# Patient Record
Sex: MALE | Race: AMERICAN INDIAN OR ALASKA NATIVE
[De-identification: names, ages, dates, MRNs, and addresses within clinical notes are randomized per-mention and may not be internally consistent; named-entity substitution may affect disease eponyms.]

---

## 2019-01-27 ENCOUNTER — HOSPITAL ENCOUNTER (EMERGENCY)
Dept: HOSPITAL 5 - ED | Age: 24
LOS: 1 days | Discharge: LEFT BEFORE DISCHARGE | End: 2019-01-28
Payer: SELF-PAY

## 2019-01-27 VITALS — DIASTOLIC BLOOD PRESSURE: 85 MMHG | SYSTOLIC BLOOD PRESSURE: 150 MMHG

## 2019-01-27 DIAGNOSIS — Z53.21: ICD-10-CM

## 2019-01-27 DIAGNOSIS — R30.0: Primary | ICD-10-CM

## 2019-02-01 ENCOUNTER — HOSPITAL ENCOUNTER (EMERGENCY)
Dept: HOSPITAL 5 - ED | Age: 24
Discharge: HOME | End: 2019-02-01
Payer: SELF-PAY

## 2019-02-01 DIAGNOSIS — F17.200: ICD-10-CM

## 2019-02-01 DIAGNOSIS — Z88.8: ICD-10-CM

## 2019-02-01 DIAGNOSIS — Z20.2: Primary | ICD-10-CM

## 2019-02-01 PROCEDURE — 96372 THER/PROPH/DIAG INJ SC/IM: CPT

## 2019-02-01 PROCEDURE — 99282 EMERGENCY DEPT VISIT SF MDM: CPT

## 2019-02-01 NOTE — EMERGENCY DEPARTMENT REPORT
ED Male  HPI





- General


Chief complaint: Urogenital-Male


Stated complaint: PAINFUL URINATING


Time Seen by Provider: 02/01/19 22:48


Source: patient


Mode of arrival: Ambulatory


Limitations: No Limitations





- History of Present Illness


Initial comments: 





This is a 23-year-old male reports that he was exposed to chlamydia from his 

girlfriend.  He says she tested positive and was treated and sent him to be 

treated.  He denies any penile discharge report reports that he has some 

burning.  Denies any abdominal or back pain.  Denies any fever or chills.  Den

ies any blood in his urine.


MD Complaint: other (exposure to chlamydia)


Onset/Timing: 3


Radiation: none


Severity scale (0 -10): 0


denies: discharge, swelling, mass, rash, urinary retention, blood in urine, 

dysuria, fever, nausea/vomiting, incontinence





- Related Data


Sexually active: Yes (history)


                                    Allergies











Allergy/AdvReac Type Severity Reaction Status Date / Time


 


niacin Allergy  Hives Verified 01/27/19 22:18














ED Review of Systems


ROS: 


Stated complaint: PAINFUL URINATING


Other details as noted in HPI





Constitutional: denies: chills, fever


ENT: denies: throat pain


Respiratory: denies: cough, shortness of breath


Cardiovascular: denies: chest pain, palpitations, edema, syncope


Gastrointestinal: denies: abdominal pain, nausea, vomiting


Genitourinary: other (exposure to chlamydia).  denies: urgency, dysuria, 

frequency, hematuria, discharge


Musculoskeletal: denies: back pain, arthralgia, myalgia


Skin: denies: rash


Neurological: denies: headache





ED Past Medical Hx





- Past Medical History


Previous Medical History?: No





- Surgical History


Past Surgical History?: No





- Family History


Family history: no significant





- Social History


Smoking Status: Current Every Day Smoker


Substance Use Type: None





ED Physical Exam





- General


Limitations: No Limitations


General appearance: alert, in no apparent distress





- Head


Head exam: Present: atraumatic, normocephalic, normal inspection





- Eye


Eye exam: Present: normal appearance, PERRL, EOMI


Pupils: Present: normal accommodation





- ENT


ENT exam: Present: normal exam, normal orophraynx, mucous membranes moist





- Neck


Neck exam: Present: normal inspection, full ROM.  Absent: tenderness, 

lymphadenopathy





- Respiratory


Respiratory exam: Present: normal lung sounds bilaterally.  Absent: respiratory 

distress, chest wall tenderness





- Cardiovascular


Cardiovascular Exam: Present: regular rate, normal rhythm, normal heart sounds





- GI/Abdominal


GI/Abdominal exam: Present: soft, normal bowel sounds.  Absent: distended, 

tenderness, rigid





- Extremities Exam


Extremities exam: Present: normal inspection, full ROM, normal capillary refill,

other (No cce. + 2 pulses in all extremities, no neurovascular compromise).  

Absent: tenderness, pedal edema, joint swelling





- Back Exam


Back exam: Present: normal inspection, full ROM, other (M elicited N difficu

lties O away that he no chronic profile but no).  Absent: tenderness, CVA 

tenderness (R), CVA tenderness (L)





ED Course


                                   Vital Signs











  02/01/19





  22:28


 


Temperature 98.5 F


 


Pulse Rate 98 H


 


Respiratory 18





Rate 


 


Blood Pressure 141/62


 


O2 Sat by Pulse 100





Oximetry 














- Reevaluation(s)


Reevaluation #1: 





02/01/19 23:49


Patient treated for gonorrhea and chlamydia in emergency room with Rocephin and 

azithromycin. After observation, he had no adverse reaction so he was discharged

home in stable condition.





ED Medical Decision Making





- Medical Decision Making





This is a 23-year-old male here for STD treatment after his girlfriend told him 

that she was treated for chlamydia.  He is here to be treated.  Patient was 

given azithromycin 1 g by mouth to treat chlamydia and Rocephin 250 mg IM for 

gonorrhea.  I discussed with him that he needs to practice safe sex and follow 

up with [facility] or [facility] in 7-10 days for STD 

testing.  Patient agreed and discharged home in stable condition.


Critical care attestation.: 


If time is entered above; I have spent that time in minutes in the direct care 

of this critically ill patient, excluding procedure time.








ED Disposition


Clinical Impression: 


 Exposure to STD





Disposition: DC-01 TO HOME OR SELFCARE


Is pt being admited?: No


Does the pt Need Aspirin: No


Condition: Stable


Instructions:  Chlamydia Infection (ED), Sexually Transmitted Diseases (ED), 

Safe Sex (ED)


Additional Instructions: 


Please practice safe sex


Do not have any sexual activity for the next 2 weeks and within the next 7-10 

days please go to [facility] 

to get STD check to make sure that you do not have STD.


You were treated for gonorrhea and chlamydia in emergency room.


Referrals: 


[facility] [Outside] - 7-10 days


[facility] [Outside] - 7-10 days


Forms:  Work/School Release Form(ED)

## 2019-02-03 VITALS — SYSTOLIC BLOOD PRESSURE: 141 MMHG | DIASTOLIC BLOOD PRESSURE: 62 MMHG

## 2019-12-05 ENCOUNTER — HOSPITAL ENCOUNTER (EMERGENCY)
Dept: HOSPITAL 5 - ED | Age: 24
Discharge: HOME | End: 2019-12-05
Payer: SELF-PAY

## 2019-12-05 VITALS — SYSTOLIC BLOOD PRESSURE: 166 MMHG | DIASTOLIC BLOOD PRESSURE: 76 MMHG

## 2019-12-05 DIAGNOSIS — F17.200: ICD-10-CM

## 2019-12-05 DIAGNOSIS — M94.0: Primary | ICD-10-CM

## 2019-12-05 PROCEDURE — 71046 X-RAY EXAM CHEST 2 VIEWS: CPT

## 2019-12-05 PROCEDURE — 93005 ELECTROCARDIOGRAM TRACING: CPT

## 2019-12-05 PROCEDURE — 99283 EMERGENCY DEPT VISIT LOW MDM: CPT

## 2019-12-05 PROCEDURE — 93010 ELECTROCARDIOGRAM REPORT: CPT

## 2019-12-05 NOTE — EMERGENCY DEPARTMENT REPORT
ED Chest Pain HPI





- General


Chief Complaint: Chest Pain


Stated Complaint: CHEST PAIN


Time Seen by Provider: 12/05/19 16:39


Source: patient


Mode of arrival: Ambulatory


Limitations: No Limitations





- History of Present Illness


Initial Comments: 





This is a 24-year-old -American male who presents to the emergency room 

with substernal chest pain since this morning.  Patient states he woke up with 

tightness in chest.  He is a current smoker.  No significant past medical 

history.  Patient reports one episode last month for one day which resolved.  

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 

muscle and wanted to make sure he wasn't having a heart attack.  He denies 

radiating pain, fever, chills, cough, nausea, vomiting, or weakness.


MD Complaint: chest pain


-: This morning


Onset: awoke with symptoms


Pain Location: substernal


Pain Radiation: none


Severity: moderate


Severity scale (0 -10): 6


Quality: tightness


Consistency: intermittent


Improves With: nothing


Worsens With: nothing


re: denies: nausea, vomting, diaphoresis, dyspnea, sense of impending doom


Other Symptoms: denies: cough, fever, syncope, rash, acid taste in mouth, leg 

swelling, palpitations, burping


Treatments Prior to Arrival: none


Aspirin use within the Past 7 Days: (0) No





- Related Data


                                    Allergies











Allergy/AdvReac Type Severity Reaction Status Date / Time


 


niacin Allergy  Hives Verified 01/27/19 22:18














Heart Score





- HEART Score


History: Slightly suspicious


EKG: Normal


Age: < 45


Risk factors: No known risk factors


Troponin: < normal limit


HEART Score: 0





- Critical Actions


Critical Actions: 0-3 pts:0.9-1.7%risk of adverse cardiac event.Candidate for 

discharge





ED Review of Systems


ROS: 


Stated complaint: CHEST PAIN


Other details as noted in HPI





Constitutional: denies: chills, fever


ENT: as per HPI


Respiratory: denies: cough, shortness of breath, wheezing


Cardiovascular: chest pain.  denies: palpitations


Musculoskeletal: denies: back pain, joint swelling, arthralgia


Skin: denies: rash, lesions


Neurological: denies: headache, weakness, paresthesias


Psychiatric: denies: anxiety, depression





ED Past Medical Hx





- Past Medical History


Previous Medical History?: No





- Surgical History


Past Surgical History?: No





- Social History


Smoking Status: Current Every Day Smoker





ED Physical Exam





- General


Limitations: No Limitations


General appearance: alert, in no apparent distress, obese





- ENT


ENT exam: Present: mucous membranes moist





- Respiratory


Respiratory exam: Present: normal lung sounds bilaterally, chest wall 

tenderness.  Absent: respiratory distress, wheezes, rales, rhonchi, stridor 

(tenderness along costochondral joint on the left, no erythema or swelling)





- Cardiovascular


Cardiovascular Exam: Present: regular rate, normal rhythm.  Absent: systolic 

murmur, diastolic murmur, rubs, gallop





- GI/Abdominal


GI/Abdominal exam: Present: soft, normal bowel sounds.  Absent: distended, 

tenderness, guarding, rebound, rigid





- Neurological Exam


Neurological exam: Present: alert, oriented X3, normal gait





- Psychiatric


Psychiatric exam: Present: normal affect, normal mood





- Skin


Skin exam: Present: warm, dry, intact, normal color.  Absent: rash





ED Course





                                   Vital Signs











  12/05/19





  16:37


 


Temperature 98.0 F


 


Pulse Rate 67


 


Respiratory 16





Rate 


 


Blood Pressure 166/76


 


O2 Sat by Pulse 100





Oximetry 














ED Medical Decision Making





- EKG Data


-: No EKG Interpreted by Me (EKG interpreted by attending)


EKG shows normal: sinus rhythm


Rate: bradycardia





- Radiology Data


Radiology results: report reviewed





CHEST PA AND LATERAL VIEWS





INDICATION:


Chest pain.





COMPARISON:


None.





FINDINGS:





Support devices: None.





Heart: Within normal limits.





Lungs/Pleura: No acute pulmonary or pleural findings.











IMPRESSION:


1. No significant abnormality.








- Medical Decision Making





Patient was examined by me.  Patient is nontoxic appearing and stable.  Vitals 

are normal.  Obtained EKG and x-ray of chest with no acute radiographic 

findings.  EKG interpreted by the attending sinus bradycardia.  On focal exam 

there is tenderness along osteochondral joints on the left.heart score is 0.  No

significant past medical history to believe acute MI.  Patient works in the 

warehouse and possibly pulled a muscle.  Given analgesics while in the ER.  

Instructed to take over-the-counter ibuprofen, naproxen, or Tylenol for pain 

related to costochondritis.  Patient informed of results. Instructed to take 

Tylenol or ibuprofen for pain.  Follow up with PCP or return to the ER with 

worsening symptoms.  Patient discharged home in stable condition. 


Critical care attestation.: 


If time is entered above; I have spent that time in minutes in the direct care 

of this critically ill patient, excluding procedure time.








ED Disposition


Clinical Impression: 


 Acute costochondritis





Chest pain


Qualifiers:


 Chest pain type: other chest pain Qualified Code(s): R07.89 - Other chest pain;

R07.8 - Other chest pain





Disposition: DC-01 TO HOME OR SELFCARE


Is pt being admited?: No


Condition: Stable


Instructions:  Chest Pain (ED)


Additional Instructions: 


Take ibuprofen or Tylenol as needed for pain control.





Follow up with primary care provider in 24-72 hours.





Return to ER if chest pain unresolved, shortness of breath, or difficulty 

breathing.


Referrals: 


[facility] [place] 2[facility] [Outside] - 3-5 Days


[facility] [Outside] - 3-5 Days


[facility] [Outside] - 3-5 Days


[facility] [Provider Group] - 3-5 Days


Forms:  Work/School Release Form(ED)


Time of Disposition: 19:02

## 2019-12-05 NOTE — XRAY REPORT
CHEST PA AND LATERAL VIEWS



INDICATION: 

Chest pain.



COMPARISON: 

None.



FINDINGS:



Support devices: None.



Heart: Within normal limits. 



Lungs/Pleura: No acute pulmonary or pleural findings.  







IMPRESSION:

1. No significant abnormality.



Signer Name: Gera Garcia MD 

Signed: 12/5/2019 6:04 PM

 Workstation Name: [id number]

## 2019-12-05 NOTE — EMERGENCY DEPARTMENT REPORT
Blank Doc





- Documentation


Documentation: 





24-year-old male that presents with chest pain.





This initial assessment/diagnostic orders/clinical plan/treatment(s) is/are 

subject to change based on patient's health status, clinical progression and re-

assessment by fellow clinical providers in the ED.  Further treatment and workup

at subsequent clinical providers discretion.  Patient/guardians urged not to 

elope from the ED as their condition may be serious if not clinically assessed 

and managed.  Initial orders include:


1- Patient sent to [facility] for further evaluation and treatment


2- EKG


3- CXR

## 2020-09-29 ENCOUNTER — HOSPITAL ENCOUNTER (EMERGENCY)
Dept: HOSPITAL 5 - ED | Age: 25
Discharge: HOME | End: 2020-09-29
Payer: COMMERCIAL

## 2020-09-29 VITALS — SYSTOLIC BLOOD PRESSURE: 147 MMHG | DIASTOLIC BLOOD PRESSURE: 81 MMHG

## 2020-09-29 DIAGNOSIS — Z79.899: ICD-10-CM

## 2020-09-29 DIAGNOSIS — Z48.817: ICD-10-CM

## 2020-09-29 DIAGNOSIS — L03.011: Primary | ICD-10-CM

## 2020-09-29 DIAGNOSIS — F17.200: ICD-10-CM

## 2020-09-29 PROCEDURE — 99283 EMERGENCY DEPT VISIT LOW MDM: CPT

## 2020-09-29 NOTE — XRAY REPORT
RIGHT LONG FINGER 3 VIEWS



INDICATION / CLINICAL INFORMATION:

pain and swelling to right middle finger.



COMPARISON:

None available.

 

FINDINGS:

BONES/JOINT(S): No acute fracture or subluxation. No significant degenerative changes.



SOFT TISSUES: Diffuse soft tissue swelling. No soft tissue gas or radiopaque foreign body.



ADDITIONAL FINDINGS: None.







Signer Name: Jaime Kowalski MD 

Signed: 9/29/2020 5:33 AM

Workstation Name: [id number]

## 2020-09-29 NOTE — EMERGENCY DEPARTMENT REPORT
Abscess Boil HPI





- HPI


Chief Complaint: Extremity Injury, Upper


Stated Complaint: SWOLLEN RT MIDDLE FINGER


Time Seen by Provider: 09/29/20 07:02


Location: Upper Extremity


Severity: Mild


History: Yes Pain (woth swelling), No Fever, No Purulent Drainage, No Numbness, 

No Foreign Body, No Previous History, No Insect Bite


HPI: This is a 25-year-old male nontoxic, well nourished in appearance, no acute

signs of distress presents to the ED with c/o of redness, swelling, and pain to 

right middle finger.  Patient denies any pus or drainage.  Patient denies any 

fever, chills, nausea, vomiting, chest pain, shortness of breath, headache or 

stiff neck.  Patient stated allergies to niacin.  Denies any trauma or injuries.


Home Medications: 


                                  Previous Rx's











 Medication  Instructions  Recorded  Last Taken  Type


 


Sulfamethoxazole/Trimethoprim 1 each PO BID #14 tablet 09/29/20 Unknown Rx





[Bactrim DS TAB]    











Allergies/Adverse Reactions: 


                                    Allergies











Allergy/AdvReac Type Severity Reaction Status Date / Time


 


niacin Allergy  Hives Verified 01/27/19 22:18














ED Review of Systems


ROS: 


Stated complaint: SWOLLEN RT MIDDLE FINGER


Other details as noted in HPI





Constitutional: denies: chills, fever


Eyes: denies: eye pain, eye discharge, vision change


ENT: denies: ear pain, throat pain


Respiratory: denies: cough, shortness of breath, wheezing


Cardiovascular: denies: chest pain, palpitations


Endocrine: no symptoms reported


Gastrointestinal: denies: abdominal pain, nausea, diarrhea


Genitourinary: denies: urgency, dysuria


Musculoskeletal: denies: back pain, joint swelling, arthralgia


Skin: denies: rash, lesions


Neurological: denies: headache, weakness, paresthesias


Psychiatric: denies: anxiety, depression


Hematological/Lymphatic: denies: easy bleeding, easy bruising





ED Past Medical Hx





- Past Medical History


Previous Medical History?: No





- Surgical History


Past Surgical History?: No





- Social History


Smoking Status: Current Every Day Smoker





- Medications


Home Medications: 


                                Home Medications











 Medication  Instructions  Recorded  Confirmed  Last Taken  Type


 


Sulfamethoxazole/Trimethoprim 1 each PO BID #14 tablet 09/29/20  Unknown Rx





[Bactrim DS TAB]     














ED Abscess Boil Physical Exam





- Exam


General: 


Vital signs noted. No distress. Alert and acting appropriately.





Front/Back of Body, Lg (Color): 


                            __________________________














                            __________________________





 1 - Paronychia present





Size: 2 cm


Exam: Yes Tenderness, Yes Fluctuance, Yes Normal Neurologic Exam, Yes Normal 

Circulation, No Surrounding Cellulites/Erythema, No Lymphangitis, No 

Crepitation, No Heart Murmur





I & D Note





- I & D Note


I & D Note: Patient had soaked finger with biatdine and warm water first.  Under

sterile field, I used Betadine to cleanse the area. I then used an 11 blade and 

inserted between the cuticle and nail bed for release and about 1 cc or purulent

drainage noted.  Significant decrease in swelling noted.  A sterile 4 x 4 with 

tape has been applied as dressing.  Bleeding is under control.  Patient 

tolerated the procedure well with no signs of distress noted.





ED Course


                                   Vital Signs











  09/29/20





  05:02


 


Temperature 98.0 F


 


Pulse Rate 76


 


Respiratory 17





Rate 


 


Blood Pressure 147/81


 


O2 Sat by Pulse 99





Oximetry 














- Reevaluation(s)


Reevaluation #1: 





09/29/20 07:10


Patient is speaking in full sentences with no signs of distress noted.


Critical care attestation.: 


If time is entered above; I have spent that time in minutes in the direct care 

of this critically ill patient, excluding procedure time.








ED Medical Decision Making





- Medical Decision Making





This is a 25-year-old male that presents with paronychia.  Patient is stable and

was examined by me.  This is incision and drainage and has been performed and 

patient tolerated well.  A sterile dressing has been applied.  Patient was 

educated on proper wound care.  Patient is discharged with Bactrim.  Patient was

instructed to refer to Follow-up with a primary care doctor in 3-5 days or if 

symptoms worsen and continue return to emergency room as soon as possible.  At 

time of discharge, the patient does not seem toxic or ill in appearance.  No 

acute signs of distress noted.  Patient agrees to discharge treatment plan of 

care.  No further questions noted by the patient.





ED Disposition


Clinical Impression: 


 Paronychia of right middle finger, Encounter for incision and drainage 

procedure





Disposition: DC-01 TO HOME OR SELFCARE


Is pt being admited?: No


Does the pt Need Aspirin: No


Condition: Stable


Instructions:  Paronychia (ED), Incision and Drainage (ED)


Additional Instructions: 


Follow-up with a primary care doctor in 3-5 days or if symptoms worsen and 

continue return to emergency room as soon as possible. 


Prescriptions: 


Sulfamethoxazole/Trimethoprim [Bactrim DS TAB] 1 each PO BID #14 tablet


Referrals: 


PRIMARY CARE,MD [Referring] - 3-5 Days


HEATHER SEGURA MD [Staff Physician] - 3-5 Days


Forms:  Work/School Release Form(ED)

## 2020-09-29 NOTE — EVENT NOTE
ED Screening Note


Date of service: 09/29/20


Time: 06:40


ED Screening Note: 





R middle finger pain and swelling x 3 days


possible eduard noted on exam


denies any penetrating injuries, however he believes he injured the finger on a 

rail while helping his sister move on Saturday 





This initial assessment/diagnostic orders/clinical plan/treatment(s) is/are 

subject to change based on patients health status, clinical progression and re-

assessment by fellow clinical providers in the ED. Further treatment and workup 

at subsequent clinical providers discretion. Patient/guardian urged not to elope

from the ED as their condition may be serious if not clinically assessed and 

managed. 





Initial orders include: 


I&D tray set up


percocet 5 mg

## 2022-05-09 ENCOUNTER — HOSPITAL ENCOUNTER (EMERGENCY)
Dept: HOSPITAL 5 - ED | Age: 27
Discharge: HOME | End: 2022-05-09
Payer: SELF-PAY

## 2022-05-09 VITALS — DIASTOLIC BLOOD PRESSURE: 84 MMHG | SYSTOLIC BLOOD PRESSURE: 122 MMHG

## 2022-05-09 DIAGNOSIS — M25.552: Primary | ICD-10-CM

## 2022-05-09 DIAGNOSIS — Z79.899: ICD-10-CM

## 2022-05-09 DIAGNOSIS — Z91.09: ICD-10-CM

## 2022-05-09 PROCEDURE — 99282 EMERGENCY DEPT VISIT SF MDM: CPT

## 2022-05-09 NOTE — EMERGENCY DEPARTMENT REPORT
ED Extremity Problem HPI





- General


Chief complaint: Extremity Injury, Lower


Stated complaint: LT HIP/LEG PAIN


Time Seen by Provider: 05/09/22 09:26


Source: patient


Mode of arrival: Ambulatory


Limitations: No Limitations





- History of Present Illness


Initial comments: 





25-year-old black male with no past medical history presents to the emergency 

department for evaluation of left hip pain.  He states that he was playing with 

his daughter yesterday which included him acting like a pony that she was riding

around, and it felt like something popped in his left hip area, and he has been 

having persistent left hip pain since then.  He states that pain is worse with 

palpation and ambulation, but he has been able to ambulate on a lamp.  He denies

injury or trauma.


MD Complaint: extremity pain


-: Sudden, days(s)


Location: left (1), lower extremity (Left hip)


History of Same: No


-: No myalgia, No arthralgia, No fever, No associated dyspnea, No associated 

chest pain


Severity scale (0 -10): 7


Quality: aching


Consistency: intermittent


Worsens with: weight bearing, palpation


Associated Symptoms: denies: chest pain, shortness of breath, fever, myalgias, 

arthralgias, rash





- Related Data


                                  Previous Rx's











 Medication  Instructions  Recorded  Last Taken  Type


 


Sulfamethoxazole/Trimethoprim 1 each PO BID #14 tablet 09/29/20 Unknown Rx





[Bactrim DS TAB]    


 


Cyclobenzaprine [Flexeril] 10 mg PO TID PRN #21 tab 05/09/22 Unknown Rx


 


Naproxen [Naprosyn] 500 mg PO BID #14 tab 05/09/22 Unknown Rx











                                    Allergies











Allergy/AdvReac Type Severity Reaction Status Date / Time


 


niacin Allergy  Hives Verified 01/27/19 22:18














ED Review of Systems


ROS: 


Stated complaint: LT HIP/LEG PAIN


Other details as noted in HPI





Comment: All other systems reviewed and negative


Constitutional: denies: chills, fever


Eyes: denies: eye pain, eye discharge


Respiratory: denies: cough, shortness of breath, SOB with exertion, SOB at rest,

 wheezing


Cardiovascular: denies: chest pain, palpitations, dyspnea on exertion, orthopnea


Gastrointestinal: denies: abdominal pain, nausea, vomiting


Genitourinary: denies: urgency, dysuria, frequency, hematuria, discharge, 

testicular pain


Musculoskeletal: denies: back pain


Skin: denies: rash, lesions


Neurological: denies: headache, weakness





ED Past Medical Hx





- Past Medical History


Previous Medical History?: No





- Surgical History


Past Surgical History?: No





- Social History


Smoking Status: Never Smoker





- Medications


Home Medications: 


                                Home Medications











 Medication  Instructions  Recorded  Confirmed  Last Taken  Type


 


Sulfamethoxazole/Trimethoprim 1 each PO BID #14 tablet 09/29/20  Unknown Rx





[Bactrim DS TAB]     


 


Cyclobenzaprine [Flexeril] 10 mg PO TID PRN #21 tab 05/09/22  Unknown Rx


 


Naproxen [Naprosyn] 500 mg PO BID #14 tab 05/09/22  Unknown Rx














ED Physical Exam





- General


Limitations: No Limitations


General appearance: alert





- Head


Head exam: Present: atraumatic, normocephalic





- Eye


Eye exam: Present: normal appearance.  Absent: conjunctival injection





- Neck


Neck exam: Present: normal inspection, full ROM.  Absent: tenderness, 

meningismus, lymphadenopathy, thyromegaly





- Respiratory


Respiratory exam: Present: normal lung sounds bilaterally.  Absent: respiratory 

distress, wheezes, rales, rhonchi, stridor, chest wall tenderness





- Cardiovascular


Cardiovascular Exam: Present: regular rate, normal heart sounds





- GI/Abdominal


GI/Abdominal exam: Present: soft, normal bowel sounds.  Absent: distended, 

tenderness, guarding, rebound, rigid





- Expanded Lower Extremity Exam


  ** Left


Hip exam: Present: normal inspection, full ROM, tenderness.  Absent: swelling, 

abrasion, ecchymosis, deformity, crepidus, dislocation, erythema, shortening, 

pelvic stability


Upper Leg exam: Present: normal inspection.  Absent: tenderness


Knee exam: Present: normal inspection.  Absent: tenderness


Lower Leg exam: Present: normal inspection.  Absent: tenderness


Ankle exam: Present: normal inspection.  Absent: tenderness


Foot/Toe exam: Present: normal inspection.  Absent: tenderness


Neuro vascular tendon exam: Present: no vascular compromise.  Absent: pulse 

deficit, abnormal cap refill, motor deficit, sensory deficit, extremity cold to 

touch, pallor


Gait: Positive: observed and limited by pain





- Back Exam


Back exam: Present: normal inspection.  Absent: vertebral tenderness





- Neurological Exam


Neurological exam: Present: alert, oriented X3





- Psychiatric


Psychiatric exam: Present: normal affect, normal mood





- Skin


Skin exam: Present: warm, dry, intact, normal color





ED Course


                                   Vital Signs











  05/09/22 05/09/22 05/09/22





  09:03 09:08 10:46


 


Temperature 98.9 F 98.0 F 98.2 F


 


Pulse Rate 69 56 L 64


 


Respiratory 18 18 16





Rate   


 


Blood Pressure  130/75 


 


Blood Pressure 130/75  122/84





[Right]   


 


O2 Sat by Pulse 99 99 99





Oximetry   














ED Medical Decision Making





- Medical Decision Making





25-year-old black male with no past medical history presents to the emergency 

department for evaluation of left hip pain.  He states that he was playing with 

his daughter yesterday which included him acting like a pony that she was riding

 around, and it felt like something popped in his left hip area, and he has been

 having persistent left hip pain since then.  He states that pain is worse with 

palpation and ambulation, but he has been able to ambulate on a lamp.  He denies

 injury or trauma.





No gross abnormalities noted on assessment.  Patient noted to have 

musculoskeletal pain only.  He will be treated with a one-time dose of Toradol 

and Flexeril while in the emergency department then discharged home with 

naproxen to take twice a day for the next 7 days along with as needed Flexeril 

as needed.  He is advised to follow-up in the emergency department as needed.  

He verbalized understanding of and agreement with plan of care.


Critical care attestation.: 


If time is entered above; I have spent that time in minutes in the direct care 

of this critically ill patient, excluding procedure time.








ED Disposition


Clinical Impression: 


 Left hip pain





Disposition: 01 HOME / SELF CARE / HOMELESS


Is pt being admited?: No


Does the pt Need Aspirin: No


Condition: Stable


Instructions:  How to Use Cold Therapy, Easy-to-Read, Musculoskeletal Pain


Additional Instructions: 


Take medications as prescribed.  Follow-up with primary care provider if no 

improvement or worsening symptoms.  Return to the emergency department as 

needed.


Prescriptions: 


Cyclobenzaprine [Flexeril] 10 mg PO TID PRN #21 tab


 PRN Reason: Muscle Spasm


Naproxen [Naprosyn] 500 mg PO BID #14 tab


Referrals: 


HEATHER SEGURA MD [Staff Physician] - 3-5 Days


Forms:  Work/School Release Form(ED)


Time of Disposition: 10:12